# Patient Record
Sex: MALE | Race: OTHER | Employment: FULL TIME | URBAN - METROPOLITAN AREA
[De-identification: names, ages, dates, MRNs, and addresses within clinical notes are randomized per-mention and may not be internally consistent; named-entity substitution may affect disease eponyms.]

---

## 2021-09-25 ENCOUNTER — HOSPITAL ENCOUNTER (OUTPATIENT)
Dept: GENERAL RADIOLOGY | Age: 51
Discharge: HOME | End: 2021-09-25
Attending: FAMILY MEDICINE
Payer: COMMERCIAL

## 2021-09-25 ENCOUNTER — OFFICE VISIT (OUTPATIENT)
Dept: FAMILY MEDICINE CLINIC | Facility: CLINIC | Age: 51
End: 2021-09-25
Payer: COMMERCIAL

## 2021-09-25 VITALS
SYSTOLIC BLOOD PRESSURE: 120 MMHG | OXYGEN SATURATION: 98 % | TEMPERATURE: 98 F | WEIGHT: 145 LBS | RESPIRATION RATE: 18 BRPM | HEIGHT: 62 IN | DIASTOLIC BLOOD PRESSURE: 70 MMHG | BODY MASS INDEX: 26.68 KG/M2 | HEART RATE: 65 BPM

## 2021-09-25 DIAGNOSIS — Z80.42 FAMILY HISTORY OF PROSTATE CANCER: ICD-10-CM

## 2021-09-25 DIAGNOSIS — Z00.00 ANNUAL PHYSICAL EXAM: Primary | ICD-10-CM

## 2021-09-25 DIAGNOSIS — R35.0 URINE FREQUENCY: ICD-10-CM

## 2021-09-25 DIAGNOSIS — R06.02 SHORTNESS OF BREATH: ICD-10-CM

## 2021-09-25 DIAGNOSIS — K21.9 GASTROESOPHAGEAL REFLUX DISEASE, UNSPECIFIED WHETHER ESOPHAGITIS PRESENT: ICD-10-CM

## 2021-09-25 LAB
ALBUMIN SERPL-MCNC: 3.7 G/DL (ref 3.4–5)
ALBUMIN/GLOB SERPL: 0.9 {RATIO} (ref 1–2)
ALP LIVER SERPL-CCNC: 72 U/L
ALT SERPL-CCNC: 21 U/L
ANION GAP SERPL CALC-SCNC: 4 MMOL/L (ref 0–18)
APPEARANCE: CLEAR
AST SERPL-CCNC: 18 U/L (ref 15–37)
BASOPHILS # BLD AUTO: 0.06 X10(3) UL (ref 0–0.2)
BASOPHILS NFR BLD AUTO: 1.2 %
BILIRUB SERPL-MCNC: 0.8 MG/DL (ref 0.1–2)
BILIRUBIN: NEGATIVE
BUN BLD-MCNC: 19 MG/DL (ref 7–18)
CALCIUM BLD-MCNC: 8.5 MG/DL (ref 8.5–10.1)
CHLORIDE SERPL-SCNC: 107 MMOL/L (ref 98–112)
CHOLEST SERPL-MCNC: 171 MG/DL (ref ?–200)
CO2 SERPL-SCNC: 27 MMOL/L (ref 21–32)
COMPLEXED PSA SERPL-MCNC: 0.49 NG/ML (ref ?–4)
CREAT BLD-MCNC: 0.88 MG/DL
EOSINOPHIL # BLD AUTO: 0.1 X10(3) UL (ref 0–0.7)
EOSINOPHIL NFR BLD AUTO: 2 %
ERYTHROCYTE [DISTWIDTH] IN BLOOD BY AUTOMATED COUNT: 12.6 %
GLOBULIN PLAS-MCNC: 3.9 G/DL (ref 2.8–4.4)
GLUCOSE (URINE DIPSTICK): NEGATIVE MG/DL
GLUCOSE BLD-MCNC: 96 MG/DL (ref 70–99)
HCT VFR BLD AUTO: 46.6 %
HDLC SERPL-MCNC: 61 MG/DL (ref 40–59)
HGB BLD-MCNC: 15.6 G/DL
IMM GRANULOCYTES # BLD AUTO: 0.01 X10(3) UL (ref 0–1)
IMM GRANULOCYTES NFR BLD: 0.2 %
KETONES (URINE DIPSTICK): NEGATIVE MG/DL
LDLC SERPL CALC-MCNC: 93 MG/DL (ref ?–100)
LEUKOCYTES: NEGATIVE
LYMPHOCYTES # BLD AUTO: 2.29 X10(3) UL (ref 1–4)
LYMPHOCYTES NFR BLD AUTO: 45.6 %
MCH RBC QN AUTO: 29.7 PG (ref 26–34)
MCHC RBC AUTO-ENTMCNC: 33.5 G/DL (ref 31–37)
MCV RBC AUTO: 88.8 FL
MONOCYTES # BLD AUTO: 0.48 X10(3) UL (ref 0.1–1)
MONOCYTES NFR BLD AUTO: 9.6 %
MULTISTIX LOT#: 5077 NUMERIC
NEUTROPHILS # BLD AUTO: 2.08 X10 (3) UL (ref 1.5–7.7)
NEUTROPHILS # BLD AUTO: 2.08 X10(3) UL (ref 1.5–7.7)
NEUTROPHILS NFR BLD AUTO: 41.4 %
NITRITE, URINE: NEGATIVE
NONHDLC SERPL-MCNC: 110 MG/DL (ref ?–130)
OCCULT BLOOD: NEGATIVE
OSMOLALITY SERPL CALC.SUM OF ELEC: 288 MOSM/KG (ref 275–295)
PATIENT FASTING Y/N/NP: YES
PATIENT FASTING Y/N/NP: YES
PH, URINE: 7.5 (ref 4.5–8)
PLATELET # BLD AUTO: 233 10(3)UL (ref 150–450)
POTASSIUM SERPL-SCNC: 4 MMOL/L (ref 3.5–5.1)
PROT SERPL-MCNC: 7.6 G/DL (ref 6.4–8.2)
PROTEIN (URINE DIPSTICK): NEGATIVE MG/DL
RBC # BLD AUTO: 5.25 X10(6)UL
SODIUM SERPL-SCNC: 138 MMOL/L (ref 136–145)
SPECIFIC GRAVITY: 1.02 (ref 1–1.03)
TRIGL SERPL-MCNC: 92 MG/DL (ref 30–149)
TSI SER-ACNC: 1.06 MIU/ML (ref 0.36–3.74)
URINE-COLOR: YELLOW
UROBILINOGEN,SEMI-QN: 0.2 MG/DL (ref 0–1.9)
VLDLC SERPL CALC-MCNC: 15 MG/DL (ref 0–30)
WBC # BLD AUTO: 5 X10(3) UL (ref 4–11)

## 2021-09-25 PROCEDURE — 3078F DIAST BP <80 MM HG: CPT | Performed by: FAMILY MEDICINE

## 2021-09-25 PROCEDURE — 85025 COMPLETE CBC W/AUTO DIFF WBC: CPT | Performed by: FAMILY MEDICINE

## 2021-09-25 PROCEDURE — 84443 ASSAY THYROID STIM HORMONE: CPT | Performed by: FAMILY MEDICINE

## 2021-09-25 PROCEDURE — 3074F SYST BP LT 130 MM HG: CPT | Performed by: FAMILY MEDICINE

## 2021-09-25 PROCEDURE — 99386 PREV VISIT NEW AGE 40-64: CPT | Performed by: FAMILY MEDICINE

## 2021-09-25 PROCEDURE — 93000 ELECTROCARDIOGRAM COMPLETE: CPT | Performed by: FAMILY MEDICINE

## 2021-09-25 PROCEDURE — 71046 X-RAY EXAM CHEST 2 VIEWS: CPT | Performed by: FAMILY MEDICINE

## 2021-09-25 PROCEDURE — 80053 COMPREHEN METABOLIC PANEL: CPT | Performed by: FAMILY MEDICINE

## 2021-09-25 PROCEDURE — 3008F BODY MASS INDEX DOCD: CPT | Performed by: FAMILY MEDICINE

## 2021-09-25 PROCEDURE — 80061 LIPID PANEL: CPT | Performed by: FAMILY MEDICINE

## 2021-09-25 PROCEDURE — 81003 URINALYSIS AUTO W/O SCOPE: CPT | Performed by: FAMILY MEDICINE

## 2021-09-25 RX ORDER — NICOTINE POLACRILEX 4 MG/1
1 GUM, CHEWING ORAL
Qty: 90 TABLET | Refills: 0 | Status: SHIPPED | OUTPATIENT
Start: 2021-09-25 | End: 2021-10-25

## 2021-09-25 NOTE — PATIENT INSTRUCTIONS
Exercise for a Healthier Heart     Exercise with a friend. When activity is fun, you're more likely to stick with it. You may wonder how you can improve the health of your heart. If you’re thinking about exercise, you’re on the right track.  You don’t n you enjoy. Walking is one of the easiest things you can do. You can also try swimming, riding a bike, dancing, or taking an exercise class.   Stop exercising and call your doctor if you:  · Have chest pain or feel dizzy or lightheaded  · Feel burning, tight molasses. Cut your usual amount by half. · Use non-sugar sweeteners. Stevia, aspartame, and sucralose can satisfy a sweet tooth without adding calories. · Swap out sugar-filled soda and other drinks. Buy sugar-free or low-calorie beverages.  Remember denise your food with herbs and flavorings. Try lemon, garlic, and onion, or salt-free herb seasonings. · Limit convenience foods, such as boxed or canned foods and restaurant food. · Read food labels and choose lower-sodium options. Step 3.  Limit sugar  · limiting chocolate, peppermint, and spearmint. These can make acid reflux worse in some people. Watch when you eat  · Don't lie down for 3 hours after eating. · Don't snack before going to bed.     Raise your head  Raising your head and upper body by 4

## 2021-09-25 NOTE — PROGRESS NOTES
Chyna Gonzalez is a 54year old male who presents for a complete physical exam.   HPI:    line used. Chest sharp pain with sob comes and goes and last for few secs and its presentfor a while. First it will happen it once while.  Sarah pain  NEURO: denies headaches or dizziness  PSYCHE: denies depression or anxiety      EXAM:   Resp 18   Ht 5' 2\" (1.575 m)   Wt 145 lb (65.8 kg)   SpO2 98%   BMI 26.52 kg/m²   Body mass index is 26.52 kg/m².    GENERAL: well nourished,in no apparent distre

## 2021-09-28 ENCOUNTER — TELEPHONE (OUTPATIENT)
Dept: FAMILY MEDICINE CLINIC | Facility: CLINIC | Age: 51
End: 2021-09-28

## 2021-09-28 DIAGNOSIS — R06.02 SHORTNESS OF BREATH: Primary | ICD-10-CM

## 2021-09-28 DIAGNOSIS — R07.9 CHEST PAIN, UNSPECIFIED TYPE: ICD-10-CM

## 2021-09-28 NOTE — TELEPHONE ENCOUNTER
----- Message from Lakshmi Sandhu MD sent at 9/28/2021  8:56 AM CDT -----  Please inform chest xray normal and blood test normal. Chest pain can be musculoskeletal in nature. Can take tylenol /ibuprofen as needed.  If chest pain/sob continues for 7 to 10 a

## 2021-09-29 ENCOUNTER — TELEPHONE (OUTPATIENT)
Dept: FAMILY MEDICINE CLINIC | Facility: CLINIC | Age: 51
End: 2021-09-29

## 2021-09-29 NOTE — TELEPHONE ENCOUNTER
Please call patient next wk on wednesdya 10/6 and get update on chest pain and sob.  If still has it schedule ov with me

## 2021-09-30 NOTE — TELEPHONE ENCOUNTER
Patient called back for test results. Patient understands his chest xrays and blood test are normal. Patient states his right arm falls asleep when he lays down.  Also when he runs for 1 min he stats to have chest pressure and sob for seconds and it goes aw

## 2021-10-01 NOTE — TELEPHONE ENCOUNTER
His preliminary testing is normal so I would recommend seeing a cardiology for chest pain if they want to do further testing ot make sure his chest pain is not heart related. Referral placed.

## 2021-10-13 NOTE — TELEPHONE ENCOUNTER
Pt was transferred to me. (Sri [place]ankan speaking)  Pt states he is been having SOB,it least x1 second . 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   Pt thinks this happened in [id number] was having a bad cough x 1 month and since th

## 2021-10-13 NOTE — TELEPHONE ENCOUNTER
FYI we have reached out x3 with no response.     Future Appointments   Date Time Provider Mega Graham   11/10/2021  9:20 AM Rafat Mueller MD [facility]

## 2021-10-19 ENCOUNTER — OFFICE VISIT (OUTPATIENT)
Dept: FAMILY MEDICINE CLINIC | Facility: CLINIC | Age: 51
End: 2021-10-19
Payer: COMMERCIAL

## 2021-10-19 VITALS
RESPIRATION RATE: 18 BRPM | HEIGHT: 62 IN | OXYGEN SATURATION: 98 % | HEART RATE: 84 BPM | WEIGHT: 147 LBS | BODY MASS INDEX: 27.05 KG/M2 | DIASTOLIC BLOOD PRESSURE: 84 MMHG | SYSTOLIC BLOOD PRESSURE: 124 MMHG | TEMPERATURE: 98 F

## 2021-10-19 DIAGNOSIS — K21.9 GASTROESOPHAGEAL REFLUX DISEASE, UNSPECIFIED WHETHER ESOPHAGITIS PRESENT: ICD-10-CM

## 2021-10-19 DIAGNOSIS — R07.9 CHEST PAIN, UNSPECIFIED TYPE: Primary | ICD-10-CM

## 2021-10-19 PROCEDURE — 3079F DIAST BP 80-89 MM HG: CPT | Performed by: FAMILY MEDICINE

## 2021-10-19 PROCEDURE — 3074F SYST BP LT 130 MM HG: CPT | Performed by: FAMILY MEDICINE

## 2021-10-19 PROCEDURE — 3008F BODY MASS INDEX DOCD: CPT | Performed by: FAMILY MEDICINE

## 2021-10-19 PROCEDURE — 99214 OFFICE O/P EST MOD 30 MIN: CPT | Performed by: FAMILY MEDICINE

## 2021-10-19 NOTE — PROGRESS NOTES
Patient presents with:  Chest Pressure: issues x years, per pt       HPI:    Patient ID: Damian Maharaj is a 54year old male. HPI    used. Patient is here for follow-up on chest pain.   During the last visit EKG and chest x-ray is not cardiac origin and this can be secondary to anxiety will consider starting on medication. Verbalized understanding.  - CARDIO - INTERNAL    #2 GERD continue medication. Follow-up with GI. No follow-ups on file.

## 2022-01-28 PROBLEM — Z12.11 COLON CANCER SCREENING: Status: ACTIVE | Noted: 2022-01-28

## 2022-01-28 PROBLEM — R10.13 DYSPEPSIA: Status: ACTIVE | Noted: 2022-01-28

## 2022-02-03 ENCOUNTER — TELEPHONE (OUTPATIENT)
Dept: FAMILY MEDICINE CLINIC | Facility: CLINIC | Age: 52
End: 2022-02-03

## 2022-02-03 NOTE — TELEPHONE ENCOUNTER
Dr. Sherrill Zelaya office () is requiring a covid test prior to pt's sx scheduled 2/15/22 per protocol.

## 2022-02-03 NOTE — TELEPHONE ENCOUNTER
Spoke with pt's wife (Romanian speaking)  Chetna Morgan per [employer] consent. GI placed COVID test already  Central scheduling # given to wife. She verbalized understanding.

## 2022-02-13 ENCOUNTER — LAB ENCOUNTER (OUTPATIENT)
Dept: LAB | Facility: HOSPITAL | Age: 52
End: 2022-02-13
Attending: INTERNAL MEDICINE
Payer: COMMERCIAL

## 2022-02-13 DIAGNOSIS — Z01.818 PRE-OP TESTING: ICD-10-CM

## 2022-02-14 LAB — SARS-COV-2 RNA RESP QL NAA+PROBE: NOT DETECTED

## 2022-02-16 PROBLEM — Z12.11 SPECIAL SCREENING FOR MALIGNANT NEOPLASM OF COLON: Status: ACTIVE | Noted: 2022-02-16

## 2022-02-16 PROBLEM — Z12.11 SPECIAL SCREENING FOR MALIGNANT NEOPLASMS, COLON: Status: ACTIVE | Noted: 2022-02-16

## 2022-02-16 PROBLEM — K21.9 GASTROESOPHAGEAL REFLUX DISEASE: Status: ACTIVE | Noted: 2022-02-16

## 2022-04-22 ENCOUNTER — HOSPITAL ENCOUNTER (EMERGENCY)
Facility: HOSPITAL | Age: 52
Discharge: HOME | End: 2022-04-22
Attending: EMERGENCY MEDICINE
Payer: COMMERCIAL

## 2022-04-22 ENCOUNTER — HOSPITAL ENCOUNTER (OUTPATIENT)
Age: 52
Discharge: ANOTHER HOSPITAL | End: 2022-04-22
Payer: COMMERCIAL

## 2022-04-22 VITALS
TEMPERATURE: 98 F | SYSTOLIC BLOOD PRESSURE: 123 MMHG | DIASTOLIC BLOOD PRESSURE: 72 MMHG | OXYGEN SATURATION: 96 % | BODY MASS INDEX: 24 KG/M2 | WEIGHT: 149.94 LBS | HEART RATE: 80 BPM | RESPIRATION RATE: 18 BRPM

## 2022-04-22 VITALS
DIASTOLIC BLOOD PRESSURE: 68 MMHG | TEMPERATURE: 98 F | RESPIRATION RATE: 14 BRPM | HEART RATE: 88 BPM | SYSTOLIC BLOOD PRESSURE: 112 MMHG | OXYGEN SATURATION: 96 %

## 2022-04-22 DIAGNOSIS — R30.0 DYSURIA: ICD-10-CM

## 2022-04-22 DIAGNOSIS — N41.0 ACUTE PROSTATITIS: Primary | ICD-10-CM

## 2022-04-22 DIAGNOSIS — N50.819 PAIN IN TESTICLE, UNSPECIFIED LATERALITY: Primary | ICD-10-CM

## 2022-04-22 LAB
BILIRUB UR QL STRIP.AUTO: NEGATIVE
COLOR UR AUTO: YELLOW
GLUCOSE UR STRIP.AUTO-MCNC: NEGATIVE MG/DL
KETONES UR STRIP.AUTO-MCNC: NEGATIVE MG/DL
LEUKOCYTE ESTERASE UR QL STRIP.AUTO: NEGATIVE
NITRITE UR QL STRIP.AUTO: NEGATIVE
PH UR STRIP.AUTO: 8 [PH] (ref 5–8)
POCT BILIRUBIN URINE: NEGATIVE
POCT GLUCOSE URINE: NEGATIVE MG/DL
POCT KETONE URINE: NEGATIVE MG/DL
POCT LEUKOCYTE ESTERASE URINE: NEGATIVE
POCT NITRITE URINE: NEGATIVE
POCT PH URINE: 7 (ref 5–8)
POCT PROTEIN URINE: NEGATIVE MG/DL
POCT SPECIFIC GRAVITY URINE: 1.03
POCT URINE COLOR: YELLOW
POCT UROBILINOGEN URINE: 0.2 MG/DL
PROT UR STRIP.AUTO-MCNC: NEGATIVE MG/DL
RBC UR QL AUTO: NEGATIVE
SP GR UR STRIP.AUTO: 1.02 (ref 1–1.03)
UROBILINOGEN UR STRIP.AUTO-MCNC: <2 MG/DL

## 2022-04-22 PROCEDURE — 99283 EMERGENCY DEPT VISIT LOW MDM: CPT

## 2022-04-22 PROCEDURE — 99205 OFFICE O/P NEW HI 60 MIN: CPT | Performed by: PHYSICIAN ASSISTANT

## 2022-04-22 PROCEDURE — 81003 URINALYSIS AUTO W/O SCOPE: CPT

## 2022-04-22 PROCEDURE — 81002 URINALYSIS NONAUTO W/O SCOPE: CPT | Performed by: PHYSICIAN ASSISTANT

## 2022-04-22 RX ORDER — LEVOFLOXACIN 750 MG/1
750 TABLET ORAL DAILY
Qty: 14 TABLET | Refills: 0 | Status: SHIPPED | OUTPATIENT
Start: 2022-04-22 | End: 2022-05-06

## 2022-04-22 NOTE — ED INITIAL ASSESSMENT (HPI)
X 1 wk Pt c/o dysuria, penile pain during the day, penile drainage/uriantion leaking. Pt reports this occurring in the past but only lasting for 1 day and attributes to prostate issues. Testicular pain/discomfort, burning with urination.

## 2022-04-23 NOTE — ED INITIAL ASSESSMENT (HPI)
Pain with urination over the last week, pain comes and goes although at times constant. Denies any swelling to scrotum or testicles. Fevers or lower back or abdominal pains.   Hx of hematuria per seen by PCP for this no hx of seeing uro

## 2022-05-28 ENCOUNTER — OFFICE VISIT (OUTPATIENT)
Dept: FAMILY MEDICINE CLINIC | Facility: CLINIC | Age: 52
End: 2022-05-28
Payer: COMMERCIAL

## 2022-05-28 VITALS
OXYGEN SATURATION: 98 % | BODY MASS INDEX: 23.78 KG/M2 | HEIGHT: 66 IN | RESPIRATION RATE: 18 BRPM | DIASTOLIC BLOOD PRESSURE: 60 MMHG | SYSTOLIC BLOOD PRESSURE: 110 MMHG | HEART RATE: 71 BPM | WEIGHT: 148 LBS

## 2022-05-28 DIAGNOSIS — N41.0 ACUTE PROSTATITIS: Primary | ICD-10-CM

## 2022-05-28 DIAGNOSIS — K64.9 HEMORRHOIDS, UNSPECIFIED HEMORRHOID TYPE: ICD-10-CM

## 2022-05-28 LAB
APPEARANCE: CLEAR
BILIRUBIN: NEGATIVE
GLUCOSE (URINE DIPSTICK): NEGATIVE MG/DL
KETONES (URINE DIPSTICK): NEGATIVE MG/DL
LEUKOCYTES: NEGATIVE
MULTISTIX LOT#: NORMAL NUMERIC
NITRITE, URINE: NEGATIVE
OCCULT BLOOD: NEGATIVE
PH, URINE: 7.5 (ref 4.5–8)
PROTEIN (URINE DIPSTICK): NEGATIVE MG/DL
SPECIFIC GRAVITY: 1.02 (ref 1–1.03)
URINE-COLOR: YELLOW
UROBILINOGEN,SEMI-QN: 0.2 MG/DL (ref 0–1.9)

## 2022-05-28 PROCEDURE — 87086 URINE CULTURE/COLONY COUNT: CPT | Performed by: FAMILY MEDICINE

## 2022-05-28 RX ORDER — LEVOFLOXACIN 750 MG/1
750 TABLET ORAL DAILY
Qty: 14 TABLET | Refills: 0 | Status: SHIPPED | OUTPATIENT
Start: 2022-05-28 | End: 2022-06-11

## 2022-05-28 RX ORDER — HYDROCORTISONE 25 MG/G
1 CREAM TOPICAL 2 TIMES DAILY
Qty: 28 G | Refills: 0 | Status: SHIPPED | OUTPATIENT
Start: 2022-05-28 | End: 2022-06-07

## 2022-06-01 ENCOUNTER — TELEPHONE (OUTPATIENT)
Dept: FAMILY MEDICINE CLINIC | Facility: CLINIC | Age: 52
End: 2022-06-01

## 2022-06-01 NOTE — TELEPHONE ENCOUNTER
----- Message from Steph Oneil MD sent at 5/31/2022  5:32 PM CDT -----  Please inform urine culture results shows no growth however his symptoms?

## 2022-06-02 NOTE — TELEPHONE ENCOUNTER
Wife advised. Verbalized understanding. Ok per [employer] form  Advised sx resolved. Advised if they return let us know. Verbalized understanding.

## 2022-08-09 ENCOUNTER — OFFICE VISIT (OUTPATIENT)
Dept: SURGERY | Facility: CLINIC | Age: 52
End: 2022-08-09
Payer: COMMERCIAL

## 2022-08-09 DIAGNOSIS — R82.90 URINE FINDING: Primary | ICD-10-CM

## 2022-08-09 LAB
BILIRUBIN: NEGATIVE
GLUCOSE (URINE DIPSTICK): NEGATIVE MG/DL
KETONES (URINE DIPSTICK): NEGATIVE MG/DL
LEUKOCYTES: NEGATIVE
MULTISTIX LOT#: NORMAL NUMERIC
NITRITE, URINE: NEGATIVE
OCCULT BLOOD: NEGATIVE
PH, URINE: 7.5 (ref 4.5–8)
PROTEIN (URINE DIPSTICK): NEGATIVE MG/DL
SPECIFIC GRAVITY: 1.02 (ref 1–1.03)
URINE-COLOR: YELLOW
UROBILINOGEN,SEMI-QN: 0.2 MG/DL (ref 0–1.9)

## 2022-08-09 PROCEDURE — 99203 OFFICE O/P NEW LOW 30 MIN: CPT | Performed by: UROLOGY

## 2022-08-09 PROCEDURE — 81003 URINALYSIS AUTO W/O SCOPE: CPT | Performed by: UROLOGY

## 2023-10-10 ENCOUNTER — OFFICE VISIT (OUTPATIENT)
Dept: OTOLARYNGOLOGY | Facility: CLINIC | Age: 53
End: 2023-10-10

## 2023-10-10 VITALS — HEIGHT: 64 IN | WEIGHT: 150 LBS | BODY MASS INDEX: 25.61 KG/M2

## 2023-10-10 DIAGNOSIS — H61.23 BILATERAL IMPACTED CERUMEN: Primary | ICD-10-CM

## 2023-10-10 PROCEDURE — 99202 OFFICE O/P NEW SF 15 MIN: CPT | Performed by: OTOLARYNGOLOGY

## 2023-10-10 PROCEDURE — 3008F BODY MASS INDEX DOCD: CPT | Performed by: OTOLARYNGOLOGY

## 2025-05-08 ENCOUNTER — TELEPHONE (OUTPATIENT)
Dept: FAMILY MEDICINE CLINIC | Facility: CLINIC | Age: 55
End: 2025-05-08

## 2025-05-08 DIAGNOSIS — Z01.812 PRE-OPERATIVE LABORATORY EXAMINATION: ICD-10-CM

## 2025-05-08 DIAGNOSIS — Z01.818 PREOP EXAMINATION: Primary | ICD-10-CM

## 2025-05-09 ENCOUNTER — LAB ENCOUNTER (OUTPATIENT)
Dept: LAB | Facility: HOSPITAL | Age: 55
End: 2025-05-09
Attending: FAMILY MEDICINE
Payer: OTHER MISCELLANEOUS

## 2025-05-09 ENCOUNTER — OFFICE VISIT (OUTPATIENT)
Dept: FAMILY MEDICINE CLINIC | Facility: CLINIC | Age: 55
End: 2025-05-09
Payer: OTHER MISCELLANEOUS

## 2025-05-09 ENCOUNTER — HOSPITAL ENCOUNTER (OUTPATIENT)
Dept: GENERAL RADIOLOGY | Facility: HOSPITAL | Age: 55
Discharge: HOME | End: 2025-05-09
Attending: FAMILY MEDICINE
Payer: OTHER MISCELLANEOUS

## 2025-05-09 VITALS
SYSTOLIC BLOOD PRESSURE: 110 MMHG | WEIGHT: 150 LBS | HEART RATE: 71 BPM | HEIGHT: 66 IN | BODY MASS INDEX: 24.11 KG/M2 | RESPIRATION RATE: 16 BRPM | TEMPERATURE: 97 F | DIASTOLIC BLOOD PRESSURE: 72 MMHG | OXYGEN SATURATION: 98 %

## 2025-05-09 DIAGNOSIS — Z01.812 PRE-OPERATIVE LABORATORY EXAMINATION: ICD-10-CM

## 2025-05-09 DIAGNOSIS — M48.062 SPINAL STENOSIS OF LUMBAR REGION WITH NEUROGENIC CLAUDICATION: Primary | ICD-10-CM

## 2025-05-09 DIAGNOSIS — K21.00 GASTROESOPHAGEAL REFLUX DISEASE WITH ESOPHAGITIS WITHOUT HEMORRHAGE: ICD-10-CM

## 2025-05-09 DIAGNOSIS — Z01.818 PREOP EXAMINATION: ICD-10-CM

## 2025-05-09 DIAGNOSIS — R10.13 DYSPEPSIA: ICD-10-CM

## 2025-05-09 DIAGNOSIS — R07.89 ATYPICAL CHEST PAIN: ICD-10-CM

## 2025-05-09 LAB
ALBUMIN SERPL-MCNC: 4.6 G/DL (ref 3.2–4.8)
ALBUMIN/GLOB SERPL: 1.6 {RATIO} (ref 1–2)
ALP LIVER SERPL-CCNC: 79 U/L (ref 45–117)
ALT SERPL-CCNC: 16 U/L (ref 10–49)
ANION GAP SERPL CALC-SCNC: 6 MMOL/L (ref 0–18)
APTT PPP: 30.4 SECONDS (ref 23–36)
AST SERPL-CCNC: 18 U/L (ref ?–34)
ATRIAL RATE: 62 BPM
BASOPHILS # BLD AUTO: 0.06 X10(3) UL (ref 0–0.2)
BASOPHILS NFR BLD AUTO: 0.9 %
BILIRUB SERPL-MCNC: 0.8 MG/DL (ref 0.3–1.2)
BILIRUB UR QL: NEGATIVE
BUN BLD-MCNC: 21 MG/DL (ref 9–23)
BUN/CREAT SERPL: 23.1 (ref 10–20)
CALCIUM BLD-MCNC: 9.1 MG/DL (ref 8.7–10.4)
CHLORIDE SERPL-SCNC: 102 MMOL/L (ref 98–112)
CLARITY UR: CLEAR
CO2 SERPL-SCNC: 28 MMOL/L (ref 21–32)
COLOR UR: YELLOW
CREAT BLD-MCNC: 0.91 MG/DL (ref 0.7–1.3)
DEPRECATED RDW RBC AUTO: 41.6 FL (ref 35.1–46.3)
EGFRCR SERPLBLD CKD-EPI 2021: 101 ML/MIN/1.73M2 (ref 60–?)
EOSINOPHIL # BLD AUTO: 0.23 X10(3) UL (ref 0–0.7)
EOSINOPHIL NFR BLD AUTO: 3.4 %
ERYTHROCYTE [DISTWIDTH] IN BLOOD BY AUTOMATED COUNT: 13.3 % (ref 11–15)
FASTING STATUS PATIENT QL REPORTED: YES
GLOBULIN PLAS-MCNC: 2.9 G/DL (ref 2–3.5)
GLUCOSE BLD-MCNC: 89 MG/DL (ref 70–99)
GLUCOSE UR-MCNC: NORMAL MG/DL
HCT VFR BLD AUTO: 42.1 % (ref 39–53)
HGB BLD-MCNC: 14.3 G/DL (ref 13–17.5)
HGB UR QL STRIP.AUTO: NEGATIVE
IMM GRANULOCYTES # BLD AUTO: 0.02 X10(3) UL (ref 0–1)
IMM GRANULOCYTES NFR BLD: 0.3 %
INR BLD: 0.96 (ref 0.8–1.2)
KETONES UR-MCNC: NEGATIVE MG/DL
LEUKOCYTE ESTERASE UR QL STRIP.AUTO: NEGATIVE
LYMPHOCYTES # BLD AUTO: 2.65 X10(3) UL (ref 1–4)
LYMPHOCYTES NFR BLD AUTO: 39.4 %
MCH RBC QN AUTO: 29.2 PG (ref 26–34)
MCHC RBC AUTO-ENTMCNC: 34 G/DL (ref 31–37)
MCV RBC AUTO: 85.9 FL (ref 80–100)
MONOCYTES # BLD AUTO: 0.63 X10(3) UL (ref 0.1–1)
MONOCYTES NFR BLD AUTO: 9.4 %
NEUTROPHILS # BLD AUTO: 3.14 X10 (3) UL (ref 1.5–7.7)
NEUTROPHILS # BLD AUTO: 3.14 X10(3) UL (ref 1.5–7.7)
NEUTROPHILS NFR BLD AUTO: 46.6 %
NITRITE UR QL STRIP.AUTO: NEGATIVE
OSMOLALITY SERPL CALC.SUM OF ELEC: 284 MOSM/KG (ref 275–295)
P AXIS: 74 DEGREES
P-R INTERVAL: 146 MS
PH UR: 6 [PH] (ref 5–8)
PLATELET # BLD AUTO: 244 10(3)UL (ref 150–450)
POTASSIUM SERPL-SCNC: 3.7 MMOL/L (ref 3.5–5.1)
PROT SERPL-MCNC: 7.5 G/DL (ref 5.7–8.2)
PROT UR-MCNC: NEGATIVE MG/DL
PROTHROMBIN TIME: 13.3 SECONDS (ref 11.6–14.8)
Q-T INTERVAL: 382 MS
QRS DURATION: 90 MS
QTC CALCULATION (BEZET): 387 MS
R AXIS: 82 DEGREES
RBC # BLD AUTO: 4.9 X10(6)UL (ref 4.3–5.7)
SODIUM SERPL-SCNC: 136 MMOL/L (ref 136–145)
SP GR UR STRIP: >1.03 (ref 1–1.03)
T AXIS: 45 DEGREES
UROBILINOGEN UR STRIP-ACNC: NORMAL
VENTRICULAR RATE: 62 BPM
WBC # BLD AUTO: 6.7 X10(3) UL (ref 4–11)

## 2025-05-09 PROCEDURE — 36415 COLL VENOUS BLD VENIPUNCTURE: CPT

## 2025-05-09 PROCEDURE — 93010 ELECTROCARDIOGRAM REPORT: CPT | Performed by: INTERNAL MEDICINE

## 2025-05-09 PROCEDURE — 87081 CULTURE SCREEN ONLY: CPT

## 2025-05-09 PROCEDURE — 93005 ELECTROCARDIOGRAM TRACING: CPT

## 2025-05-09 PROCEDURE — 85730 THROMBOPLASTIN TIME PARTIAL: CPT

## 2025-05-09 PROCEDURE — 85025 COMPLETE CBC W/AUTO DIFF WBC: CPT

## 2025-05-09 PROCEDURE — 71046 X-RAY EXAM CHEST 2 VIEWS: CPT | Performed by: FAMILY MEDICINE

## 2025-05-09 PROCEDURE — 80053 COMPREHEN METABOLIC PANEL: CPT

## 2025-05-09 PROCEDURE — 81003 URINALYSIS AUTO W/O SCOPE: CPT

## 2025-05-09 PROCEDURE — 85610 PROTHROMBIN TIME: CPT

## 2025-05-09 NOTE — H&P
[facility] PRE-OP CLINIC [place]    PRE-OP NOTE    HPI:   I have been consulted by Dr. Cortez to see Chester Ramirez 55 year old male for a preoperative evaluation and medical clearance. he has a long history of worsening severe lumbar spinal stenosis . Patient is to have a L5-S1 microdiscectomy  by Dr. Cortez  on a date to be determined  .     Pt suffers significant pain and loss of function.     He has no cardiopulmonary symptoms. But he has some atypical chest pains.      He has n history of MÓNICA or DVT. Denies tobacco use.       Family History[1]   Current Medications[2]  Past Medical History[3]  Past Surgical History[4]  Social History     Socioeconomic History    Marital status:      Spouse name: Not on file    Number of children: Not on file    Years of education: Not on file    Highest education level: Not on file   Occupational History    Not on file   Tobacco Use    Smoking status: Never     Passive exposure: Never    Smokeless tobacco: Never    Tobacco comments:     non- smoker   Vaping Use    Vaping status: Never Used   Substance and Sexual Activity    Alcohol use: Never    Drug use: Never    Sexual activity: Not on file   Other Topics Concern    Not on file   Social History Narrative    Not on file     Social Drivers of Health     Food Insecurity: Not on file   Transportation Needs: Not on file   Stress: Not on file   Housing Stability: Not on file       REVIEW OF SYSTEMS:   CONSTITUTIONAL:  Denies unusual weight gain/loss, fever, chills  EENT:  Eyes:  Denies eye pain, visual loss, blurred vision, double vision. Ears, Nose, Throat:  Denies congestion, runny nose or sore throat.  INTEGUMENTARY:  Denies rashes, itching, skin lesion,   CARDIOVASCULAR:  Denies DVT. Denies chest pain, palpitations, edema, dyspnea  RESPIRATORY: Denies  MÓNICA ,Denies shortness of breath, wheezing, cough  GASTROINTESTINAL:  Denies abdominal pain, nausea, vomiting, constipation, diarrhea, or blood in  stool.  MUSCULOSKELETAL: severe pain to his low back as noted above  NEUROLOGICAL:  Denies headache, seizures, dizziness, syncope, paralysis, ataxia,  HEMATOLOGIC:  Denies anemia, bleeding or bruising.  LYMPHATICS:  Denies enlarged nodes   PSYCHIATRIC:  Denies depression or anxiety.  ENDOCRINOLOGIC: DM 2 NO,   ALLERGIES:  Denies allergic response, history of asthma, hives,     EXAM:   /72 (BP Location: Left arm)   Pulse 71   Temp 97.1 °F (36.2 °C) (Temporal)   Resp 16   Ht 5' 6\" (1.676 m)   Wt 150 lb (68 kg)   SpO2 98%   BMI 24.21 kg/m²  Estimated body mass index is 24.21 kg/m² as calculated from the following:    Height as of this encounter: 5' 6\" (1.676 m).    Weight as of this encounter: 150 lb (68 kg).   Vital signs reviewed.Appears stated age, well groomed.  Physical Exam:  GEN:  Patient is alert, awake and oriented, well developed, well nourished, no apparent distress.  HEENT:  Head:  Normocephalic, atraumatic Nose: patent, no nasal discharge  NECK: Supple, no CLAD, no carotid bruit, no thyromegaly.  SKIN: No rashes, no skin lesion, no bruising, good turgor.  HEART:  Regular rate and rhythm, no murmurs, rubs or gallops.  LUNGS: Clear to auscultation bilterally, no rales/rhonchi/wheezing.  CHEST: No tenderness.  ABDOMEN:  Soft, nondistended, nontender,  no masses, no hepatosplenomegaly.  BACK: low lumbar tenderness bilaterally ,   EXTREMITIES:  No edema, no cyanosis, no clubbing,   NEURO:  No focal deficit, speech fluent, he walks with an antalgic  gait, strength and tone, sensory intact      Lab Results   Component Value Date    WBC 5.0 09/25/2021    HGB 15.6 09/25/2021    HCT 46.6 09/25/2021    .0 09/25/2021    CREATSERUM 0.88 09/25/2021    BUN 19 (H) 09/25/2021     09/25/2021    K 4.0 09/25/2021     09/25/2021    CO2 27.0 09/25/2021    GLU 96 09/25/2021    CA 8.5 09/25/2021    ALB 3.7 09/25/2021    ALKPHO 72 09/25/2021    BILT 0.8 09/25/2021    TP 7.6 09/25/2021    AST 18  09/25/2021    ALT 21 09/25/2021    TSH 1.060 09/25/2021       No results found.    EKG 12 Lead  Result Date: 5/9/2025  Normal sinus rhythm Normal ECG No previous ECGs found in [place]      ASSESSMENT AND PLAN:   Chester Ramirez is a 55 year old male, with a hx of severe lumbar spinal stenosis  who presents for a pre-operative physical exam. Patient is to have a L5-S1 microdiscectomy  by Dr. Cortez  on a date to be determined .      ICD-10-CM    1. Spinal stenosis of lumbar region with neurogenic claudication  M48.062       2. Dyspepsia  R10.13       3. Atypical chest pain  R07.89       4. Gastroesophageal reflux disease with esophagitis without hemorrhage  K21.00       5. Pre-operative laboratory examination  Z01.812 CBC With Differential With Platelet     Comp Metabolic Panel (14)     EKG 12 Lead     MSSA and MRSA Culture Screen     Prothrombin Time (PT)     PTT, Activated     XR CHEST PA + LAT CHEST (CPT=71046)     Urinalysis with Culture Reflex      6. Preop examination  Z01.818 CBC With Differential With Platelet     Comp Metabolic Panel (14)     EKG 12 Lead     MSSA and MRSA Culture Screen     Prothrombin Time (PT)     PTT, Activated     XR CHEST PA + LAT CHEST (CPT=71046)     Urinalysis with Culture Reflex        Problem List[5]     ECG and labs are pending review     Preoperative Risk Stratification: There are no decompensated medical conditions. ASA classification 2    Medical history:  High risk surgery (vascular, thoracic, intra-peritoneal): No  CAD: No  CHF: No  Stroke: No  DM on insulin: No  Serum Creatinine >2 mg/dl: No  Patient has an RCRI score that is intermediate risk and is at intermediate  risk for major cardiac event in the perioperative period.     Patient is medically optimized and has an acceptable risk of surgery and may proceed with surgery as planned.     PLAN:    He will see Cardiology on 5/13/2025 for clearance     Patient to discontinue medications and supplements with anticoagulation  properties as per instruction.        Postoperative Recommendations:    Anticoagulation / DVT prophylaxis: SCDs, early ambulation.   GI protection: Protonix  Incentive Spirometry   Telemetry as needed  CPAP/O2 as needed   DM: QID glucoscans and sliding scale insulin as needed   Renal protection: (hydration / NSAID and ACE/ARB avoidance)   Cognitive protection: (minimize narcotics, benzodiazepines, scopolamine)     Pain management and Physical therapy as per Orthopedic service.   Home Health as needed    Thank you for the opportunity to care for your patient and to assist in managing the postoperative course.        Matheus Swift DO  5/9/2025  11:15 AM         [1] History reviewed. No pertinent family history.  [2]   No current outpatient medications on file.   [3]   Past Medical History:   Esophageal reflux   [4] History reviewed. No pertinent surgical history.  [5]   Patient Active Problem List  Diagnosis    Colon cancer screening    Dyspepsia    Special screening for malignant neoplasms, colon    Gastroesophageal reflux disease    Special screening for malignant neoplasm of colon

## 2025-07-14 ENCOUNTER — LAB ENCOUNTER (OUTPATIENT)
Dept: LAB | Age: 55
End: 2025-07-14
Attending: FAMILY MEDICINE
Payer: COMMERCIAL

## 2025-07-14 DIAGNOSIS — Z01.812 PRE-OPERATIVE LABORATORY EXAMINATION: ICD-10-CM

## 2025-07-14 DIAGNOSIS — Z01.818 PREOP EXAMINATION: ICD-10-CM

## 2025-07-14 LAB
ALBUMIN SERPL-MCNC: 4.1 G/DL (ref 3.2–4.8)
ALBUMIN/GLOB SERPL: 1.3 {RATIO} (ref 1–2)
ALP LIVER SERPL-CCNC: 72 U/L (ref 45–117)
ALT SERPL-CCNC: 14 U/L (ref 10–49)
ANION GAP SERPL CALC-SCNC: 4 MMOL/L (ref 0–18)
APTT PPP: 28.7 SECONDS (ref 23–36)
AST SERPL-CCNC: 20 U/L (ref ?–34)
BASOPHILS # BLD AUTO: 0.04 X10(3) UL (ref 0–0.2)
BASOPHILS NFR BLD AUTO: 0.7 %
BILIRUB SERPL-MCNC: 0.7 MG/DL (ref 0.3–1.2)
BILIRUB UR QL STRIP.AUTO: NEGATIVE
BUN BLD-MCNC: 16 MG/DL (ref 9–23)
CALCIUM BLD-MCNC: 9.2 MG/DL (ref 8.7–10.6)
CHLORIDE SERPL-SCNC: 105 MMOL/L (ref 98–112)
CLARITY UR REFRACT.AUTO: CLEAR
CO2 SERPL-SCNC: 31 MMOL/L (ref 21–32)
COLOR UR AUTO: YELLOW
CREAT BLD-MCNC: 1.01 MG/DL (ref 0.7–1.3)
EGFRCR SERPLBLD CKD-EPI 2021: 89 ML/MIN/1.73M2 (ref 60–?)
EOSINOPHIL # BLD AUTO: 0.17 X10(3) UL (ref 0–0.7)
EOSINOPHIL NFR BLD AUTO: 2.9 %
ERYTHROCYTE [DISTWIDTH] IN BLOOD BY AUTOMATED COUNT: 13.1 %
FASTING STATUS PATIENT QL REPORTED: YES
GLOBULIN PLAS-MCNC: 3.1 G/DL (ref 2–3.5)
GLUCOSE BLD-MCNC: 97 MG/DL (ref 70–99)
GLUCOSE UR STRIP.AUTO-MCNC: NORMAL MG/DL
HCT VFR BLD AUTO: 41.8 % (ref 39–53)
HGB BLD-MCNC: 14.4 G/DL (ref 13–17.5)
IMM GRANULOCYTES # BLD AUTO: 0.02 X10(3) UL (ref 0–1)
IMM GRANULOCYTES NFR BLD: 0.3 %
INR BLD: 0.98 (ref 0.8–1.2)
KETONES UR STRIP.AUTO-MCNC: NEGATIVE MG/DL
LEUKOCYTE ESTERASE UR QL STRIP.AUTO: NEGATIVE
LYMPHOCYTES # BLD AUTO: 2.86 X10(3) UL (ref 1–4)
LYMPHOCYTES NFR BLD AUTO: 49.1 %
MCH RBC QN AUTO: 30.4 PG (ref 26–34)
MCHC RBC AUTO-ENTMCNC: 34.4 G/DL (ref 31–37)
MCV RBC AUTO: 88.2 FL (ref 80–100)
MONOCYTES # BLD AUTO: 0.6 X10(3) UL (ref 0.1–1)
MONOCYTES NFR BLD AUTO: 10.3 %
NEUTROPHILS # BLD AUTO: 2.13 X10 (3) UL (ref 1.5–7.7)
NEUTROPHILS # BLD AUTO: 2.13 X10(3) UL (ref 1.5–7.7)
NEUTROPHILS NFR BLD AUTO: 36.7 %
NITRITE UR QL STRIP.AUTO: NEGATIVE
OSMOLALITY SERPL CALC.SUM OF ELEC: 291 MOSM/KG (ref 275–295)
PH UR STRIP.AUTO: 6 [PH] (ref 5–8)
PLATELET # BLD AUTO: 227 10(3)UL (ref 150–450)
POTASSIUM SERPL-SCNC: 3.8 MMOL/L (ref 3.5–5.1)
PROT SERPL-MCNC: 7.2 G/DL (ref 5.7–8.2)
PROT UR STRIP.AUTO-MCNC: NEGATIVE MG/DL
PROTHROMBIN TIME: 13.1 SECONDS (ref 11.6–14.8)
RBC # BLD AUTO: 4.74 X10(6)UL (ref 4.3–5.7)
SODIUM SERPL-SCNC: 140 MMOL/L (ref 136–145)
SP GR UR STRIP.AUTO: 1.03 (ref 1–1.03)
UROBILINOGEN UR STRIP.AUTO-MCNC: NORMAL MG/DL
WBC # BLD AUTO: 5.8 X10(3) UL (ref 4–11)

## 2025-07-14 PROCEDURE — 85730 THROMBOPLASTIN TIME PARTIAL: CPT

## 2025-07-14 PROCEDURE — 87081 CULTURE SCREEN ONLY: CPT

## 2025-07-14 PROCEDURE — 81001 URINALYSIS AUTO W/SCOPE: CPT

## 2025-07-14 PROCEDURE — 85025 COMPLETE CBC W/AUTO DIFF WBC: CPT

## 2025-07-14 PROCEDURE — 36415 COLL VENOUS BLD VENIPUNCTURE: CPT

## 2025-07-14 PROCEDURE — 80053 COMPREHEN METABOLIC PANEL: CPT

## 2025-07-14 PROCEDURE — 85610 PROTHROMBIN TIME: CPT

## 2025-07-21 RX ORDER — IBUPROFEN 200 MG
200 TABLET ORAL EVERY 6 HOURS PRN
COMMUNITY
End: 2025-07-24

## 2025-07-21 RX ORDER — NITROGLYCERIN 0.4 MG/1
TABLET SUBLINGUAL
COMMUNITY
Start: 2025-04-29

## 2025-07-22 NOTE — DISCHARGE INSTRUCTIONS
Instrucciones de [place]  Cuidado de la Herida  No cambie ni retire ibanez vendaje inicial postoperatorio tommy los primeros 2 loy después de la cirugía a menos que se le indique lo contrario por el personal del Dr. Neal o si está saturado de drenaje. Si se retira, aplique un vendaje limpio y seco sobre la incisión. Puede tener tiras de papel adhesivo delgadas en ibanez incisión después de la cirugía--por favor NO las retire. Se retirarán en ibanez desmond postoperatoria de 2 semanas. Puede quitarse el vendaje y ducharse el tercer día después de la cirugía. Deje que agua tibia y jabonosa fluya sobre el área de la incisión. No frote el área. Seque suavemente el área de la incisión por completo. Puede volver a aplicar un vendaje limpio y seco sobre la incisión para prevenir la irritación por la ropa, demetrius esto no es necesario después del día 3 postoperatorio. No se le permite sumergir ibanez incisión en angy bañera, jacuzzi o piscina hasta que la incisión esté completamente curada y el Dr. Neal o ibanez personal permitan estas actividades. No aplique geles antibióticos, ungüentos (Neosporin o bacitracina), lociones, peróxido o soluciones de yodo en o alrededor de la incisión.  Puede tener algo de hinchazón y/o drenaje bernie y amarillo alrededor de ibanez sitio de incisión. [place] es normal y puede tardar varias semanas en desaparecer. Por favor, no toque las costras superficiales y déjelas caer por sí solas.  Seguimiento Inmediato  Si notas enrojecimiento en la incisión o hinchazón aumentada, drenaje bernie continuo o cambio de color en el drenaje, mal olor, hinchazón significativa en la pierna, aumento del dolor y/o fiebre superior a 101.5, debes llamar a nuestra oficina. Si es después del horario laboral, debes presentarte en la lori de emergencias más cercana. Si tienes dificultad para respirar, dolor en el pecho, dolor abdominal significativo e hinchazón sin movimiento intestinal, pérdida completa de función intestinal o de la vejiga, por  favor contacta nuestra oficina y preséntate en la lori de emergencias local lo antes posible.  Después de la cirugía, puede experimentar dolor en o alrededor de la región de la incisión. También puede mariza dolor en los glúteos y las piernas, así leonora hormigueo o entumecimiento. Inicialmente, puede ser de mayor intensidad que antes de la cirugía, demetrius generalmente disminuirá con el tiempo a medida que ocurra el proceso de curación. Coral malestar es causado por la retracción quirúrgica del tejido, así leonora por la inflamación y la hinchazón de los nervios previamente comprimidos. La actividad temprana después de la cirugía es extremadamente importante para ayudar a prevenir complicaciones leonora la neumonía y los coágulos de eugenia. La actividad también promueve la recuperación, parvin la rigidez muscular, permite el desarrollo de angy cicatriz sabiha organizada y mejora ibanez resultado. Ibanez recuperación es angy parte esencial del proceso quirúrgico, así que, por favor, siga las pautas a continuación para volver a neda actividades deseadas de la manera más sanchez posible.  Semana 1  Trate de dormir al menos 8 horas cada noche. Un patrón de sueño interrumpido es común después del martinez del hospital y volverá a la normalidad con el tiempo.Evite doblarse y girar la cintura. No se doble más de lo necesario para vestirse. No gire más de lo necesario para ir al baño (limpiarse).No se siente por más de 1 hora a la vez. Camine y/o cambie de posición antes de volver a sentarse.  No puedes conducir, demetrius puedes ser llevado. Comienza un programa diario de caminatas con 1-2 shabana inicialmente; programa angy hora diaria y aumenta la distancia diariamente. Idealmente, nos gustaría que estuvieras de pie y caminando 15 minutos/hora. Come angy dieta equilibrada martinez en fibra y lewis mucha agua. Antionette los medicamentos según lo prescrito, usando narcóticos y relajantes musculares solo según sea necesario. Por favor, pregunta al Dr. Neal o a ibanez  personal cuándo es permisible reiniciar cualquier ELIZA leonora Advil, Motrin, Aleve, ibuprofeno, naproxeno, diclofenac, meloxicam o celecoxib. Antionette suavizantes de heces para ayudar con los movimientos intestinales.  Semana 2  Reanude el horario normal de levantarse y acostarse, demetrius continúe descansando tommy el día según sea necesario.No puede conducir, demetrius puede ser llevado.No levante nada que pese más de 10 a 15 libras.Continúe con las caminatas programadas, aumentando la distancia y la frecuencia según lo tolere.Puede reanudar las relaciones sexuales cuando se sienta cómodo entre 2 y 4 semanas después de la cirugía.Comience a disminuir el uso de medicamentos narcóticos para el dolor si aún no lo ha hecho.Regrese a la consulta en la oficina según lo programado para más instrucciones.  IncapacidadEl periodo habitual de recuperación para laminectomía, foraminotomía, microdiscectomía o hemilaminectomía es de 4 a 6 semanas. Sin embargo, la curación completa puede tardar de 3 a 6 meses. Algunos pacientes pueden regresar al trabajo antes que otros, dependiendo del tipo de trabajo, la respuesta a la cirugía y la capacidad para realizar otras tareas más ligeras en el lugar de trabajo. Se requiere aprobación médica antes de regresar al trabajo.  Política de Medicación para el Dolor Postoperatorio  El objetivo del Dr. Neal es hacer que te sientas lo más cómodo posible después de la cirugía. Nos damos cuenta de que el manejo del dolor no es perfecto y que experimentarás alguna incomodidad después de tu operación. Hay varios factores que limitan nuestra capacidad para eliminar completamente el dolor después de la cirugía. El leopoldo es que los medicamentos para el dolor tienen efectos secundarios. Te informamos que las dosis altas o el uso continuo de medicamentos narcóticos pueden ponerte en riesgo de problemas de jacki graves, incluyendo, demetrius no limitándose a, depresión respiratoria (disminución de la capacidad para  respirar normalmente), hipotensión (presión arterial baja), náuseas y estreñimiento, picazón generalizada, retención urinaria (incapacidad para orinar) y distensión abdominal.  El Dr. Neal prescribirá medicamentos para el dolor tommy 2-3 semanas después de la cirugía y puede referirlo a un especialista en manejo del dolor para cualquier medicamento narcótico que se solicite después de chikis período.  Prevención de la adicción a los opioides  [facility] en [place] se compromete a proteger a nuestros pacientes de la adicción a los opioides. Solo recetamos opioides cuando es necesario. Siempre que sea posible, utilizamos medicamentos para el dolor menos adictivos y opciones alternativas de manejo del dolor. Tanto los opioides de martinez dosis leonora los de baja dosis tomados tommy períodos prolongados de tiempo pueden aumentar el riesgo de adicción. Intentamos limitar nuestras recetas de opioides después de la cirugía tanto leonora sea posible, lo que puede incluir dosis más bajas de medicamentos opioides o menos pastillas. Si tiene preguntas adicionales sobre los opioides y ibanez dependencia, comuníquese con nuestra clínica.  Información de Contacto  Por hay, comuníquese con Sharee Souza PA-C, la asistente médica del lorenzo Morales [phone] para cualquier pregunta o inquietud médica.  CONTACTO DE EMERGENCIA DESPUÉS DE HORAS: Por sameer guido [phone] y presione “0” para que el operador lo conecte con el residente o el compañero de ortopedia de norm si tiene alguna pregunta o inquietud urgente después del horario laboral normal. Si no recibe respuesta del médico de norm de manera oportuna y ibanez asunto urgente se convierte en angy emergencia, debe presentarse a ibanez lori de emergencias local. Por favor, parker un seguimiento con Sharee Souza PA-C, el siguiente día laboral con angy actualización si tiene que contactarse con el médico de norm o presentarse en ibanez lori de emergencias local después de la  sheldon.    HOME INSTRUCTIONS  DISCHARGE INSTRUCTIONS  Dr. Matias Cortez M.D.  Lumbar Laminectomy / Foraminotomy /   Microdiscectomy / Hemilaminotomy  Post-Operative Instructions - Lumbar     Wound Care   Do not change or remove your initial postoperative dressing for the first 2 days after surgery unless   instructed to do so by Dr. Cortez's staff or if it is saturated by drainage. If removed, please reapply a clean  dry dressing over the incision. You may have thin adhesive paper strips on your incision after surgery--  please do NOT remove them. They will be removed at your 2-week postoperative appointment. You   may remove your dressing and shower on the third day after surgery. Let warm soapy water run over   the incisional area. Do not scrub the area. Gently pat the incisional area completely dry. You may   reapply a clean dry dressing over the incision to prevent irritation from clothing, but this is not required   after post op day 3. You are not allowed to soak your incision in a bathtub, hot tub, or swimming pool   until the incision is completely healed and Dr. Cortez or his staff permit these activities. Do not apply   antibiotic gels, ointments (Neosporin or bacitracin), lotions, peroxide or iodine solutions on or around the   incision.  You may have some swelling and/or clear yellow drainage around your incisional site. This is normal and   may take several weeks to go away. Please leave any superficial scabs alone and let them fall off on their   Own.    Immediate Follow Up   If you notice incisional redness or increased swelling, continuous clear drainage or change in color to the   drainage, malodor, significant leg swelling, increased pain and/or a fever greater than 101.5, you should   call our office. If it is after business hours you should present to the closest emergency room. If you   have shortness of breath, chest pain, significant stomach pain and bloating without a bowel movement,    complete loss of bowel or bladder function please contact our office and present to your local emergency   room as soon as possible.    After surgery you may experience pain in or around the region of the incision. Some buttock and leg pain   as well as tingling or numbness may also be present. Initially it may be of greater intensity than before   surgery but will usually subside over time as the healing process occurs. This discomfort is caused from   surgical retraction of tissue as well as inflammation and swelling of previously compressed nerves.  Early activity after surgery is extremely important to help prevent the complications such as pneumonia   and blood clots. Activity also promotes recovery, relieves muscle stiffness, allows for development of a   well-organized scar, and improves your outcome. Your recovery is an essential part of the surgical   process so please follow the guidelines below to return to your desired activities as safely as possible.      Week 1   Try to get at least 8 hours of sleep each night. A disrupted sleep pattern is common after   discharge from the hospital and will return to normal over time.   Avoid bending and twisting at the waist. No bending more than what is required to get dressed.   No twisting more than required to toilet (wipe yourself).  No sitting for more than 1 hour at a time. Walk and/or change position before returning to a   sitting position.   You may not drive, but you may be driven.  Begin a daily walking program with 1-2 blocks initially; schedule a daily time and increase distance   daily. Ideally, we would like you to be up and walking 15 minutes/hour.   Eat a balanced high-fiber diet and drink plenty of water.  Take medications as prescribed, using narcotics and muscle relaxants only as needed.   Please ask Dr. Cortez or his staff when it is permissible to restart any NSAIDs such as Advil, Motrin,   Aleve, ibuprofen, naproxen, diclofenac, meloxicam or  celecoxib.   Take stool softeners to help with bowel movements.    Week 2   Resume normal rising and retiring schedule but continue to rest throughout the day as needed.  You may not drive, but you may be driven.  No lifting of anything weighing more than 10 to 15 pounds.  Continue scheduled walking, increasing distance and frequency as tolerated.  May resume sexual relations when comfortable between 2 to 4 weeks after surgery.  Begin weaning from narcotic pain medications if you have not already.  Follow-up in the office as scheduled for further instructions.     Disability   The usual period of recovery for laminectomy, foraminotomy, microdiscectomy or hemilaminotomy is 4   to 6 weeks. Complete healing however may take up to 3 to 6 months. Some patients may return to work   sooner than others depending on their type of job, response to surgery and ability to perform other   lighter tasks in the workplace. Physician approval is required prior to returning to work.    Post-Operative Pain Medication Policy   It is Dr. Cortez's aim to make you as comfortable as possible after surgery. We realize pain management   is not perfect, and that you will have some discomfort after your operation. There are several factors   that limit our ability to completely eliminate pain after surgery. This first is that pain medications have   side effects. Please be advised that high doses or continued use of narcotic medications may put you at   risk for serious health issues including but not limited to respiratory depression (decreased ability to  breathe normally), hypotension (low blood pressure), nausea and constipation, generalized itching,   urinary retention (inability to urinate) and abdominal distention.   Dr. Cortez will prescribe pain medication for 2-3 weeks after surgery and may refer you out to a pain    for any narcotic medication requested after this period of time.    Preventing Opioid Addiction    [facility] [place] is committed to protecting our patients from Opioid addiction. We only   prescribe opioids when necessary. Whenever possible, we use less-addictive pain medication and   alternative pain management options. Both high-dosage opioids and low-dosage opioids taken over long   periods of time can increase the risk of addiction. We attempt to limit our prescriptions of opioids after   surgery as much as possible, which may include lower dosages of opioid medications or fewer pills. If you   have additional questions about Opioids and their dependency, please contact our clinic.    Contact Information   Please contact Dr. Diego New’s , at [phone] with any questions   or concerns pertaining to scheduling or other administrative issues.  Please contact Sharee Souza PA-C, Dr. Cortez’s physician assistant, at [phone] with any medical   questions or concerns.     AFTER HOURS EMERGENCY CONTACT: Please dial [phone] and press “0” for the    to connect you to the orthopaedic fellow or resident on call if you have any urgent questions or   concerns after regular business hours. If you do not hear back from the on-call physician in a timely   matter and your urgent matter turns emergent, you should present to your local emergency room.  Please follow-up with Sharee Souza PA-C the following business day with an update if you do have to   contact the on-call physician or present to your local emergency room after surgery.         AMBSURG HOME CARE INSTRUCTIONS: POST-OP ANESTHESIA  The medication that you received for sedation or general anesthesia can last up to 24 hours. Your judgment and reflexes may be altered, even if you feel like your normal self.      We Recommend:   Do not drive any motor vehicle or bicycle   Avoid mowing the lawn, playing sports, or working with power tools/applicances (power saws, electric knives or mixers)   That you have  someone stay with you on your first night home   Do not drink alcohol or take sleeping pills or tranquilizers   Do not sign legal documents within 24 hours of your procedure   If you had a nerve block for your surgery, take extra care not to put any pressure on your arm or hand for 24 hours    It is normal:  For you to have a sore throat if you had a breathing tube during surgery (while you were asleep!). The sore throat should get better within 48 hours. You can gargle with warm salt water (1/2 tsp in 4 oz warm water) or use a throat lozenge for comfort  To feel muscle aches or soreness especially in the abdomen, chest or neck. The achy feeling should go away in the next 24 hours  To feel weak, sleepy or \"wiped out\". Your should start feeling better in the next 24 hours.   To experience mild discomforts such as sore lip or tongue, headache, cramps, gas pains or a bloated feeling in your abdomen.   To experience mild back pain or soreness for a day or two if you had spinal or epidural anesthesia.   If you had laparoscopic surgery, to feel shoulder pain or discomfort on the day of surgery.   For some patients to have nausea after surgery/anesthesia    If you feel nausea or experience vomiting:   Try to move around less.   Eat less than usual or drink only liquids until the next morning   Nausea should resolve in about 24 hours    If you have a problem when you are at home:    Call your surgeons office     Discharge Instructions: After Your Surgery  You’ve just had surgery. During surgery, you were given medicine called anesthesia to keep you relaxed and free of pain. After surgery, you may have some pain or nausea. This is common. Here are some tips for feeling better and getting well after surgery.   Going home  Your healthcare provider will show you how to take care of yourself when you go home. They'll also answer your questions. Have an adult family member or friend drive you home. For the first 24 hours after  your surgery:   Don't drive or use heavy equipment.  Don't make important decisions or sign legal papers.  Take medicines as directed.  Don't drink alcohol.  Have someone stay with you, if needed. They can watch for problems and help keep you safe.  Be sure to go to all follow-up visits with your healthcare provider. And rest after your surgery for as long as your provider tells you to.   Coping with pain  If you have pain after surgery, pain medicine will help you feel better. Take it as directed, before pain becomes severe. Also, ask your healthcare provider or pharmacist about other ways to control pain. This might be with heat, ice, or relaxation. And follow any other instructions your surgeon or nurse gives you.      Stay on schedule with your medicine.     Tips for taking pain medicine  To get the best relief possible, remember these points:   Pain medicines can upset your stomach. Taking them with a little food may help.  Most pain relievers taken by mouth need at least 20 to 30 minutes to start to work.  Don't wait till your pain becomes severe before you take your medicine. Try to time your medicine so that you can take it before starting an activity. This might be before you get dressed, go for a walk, or sit down for dinner.  Constipation is a common side effect of some pain medicines. Call your healthcare provider before taking any medicines, such as laxatives or stool softeners, to help ease constipation. Also ask if you should skip any foods. Drinking lots of fluids and eating foods, such as fruits and vegetables, that are high in fiber can also help. Remember, don't take laxatives unless your surgeon has prescribed them.  Drinking alcohol and taking pain medicine can cause dizziness and slow your breathing. It can even be deadly. Don't drink alcohol while taking pain medicine.  Pain medicine can make you react more slowly to things. Don't drive or run machinery while taking pain medicine.  Your  healthcare provider may tell you to take acetaminophen to help ease your pain. Ask them how much you're supposed to take each day. Acetaminophen or other pain relievers may interact with your prescription medicines or other over-the-counter (OTC) medicines. Some prescription medicines have acetaminophen and other ingredients in them. Using both prescription and OTC acetaminophen for pain can cause you to accidentally overdose. Read the labels on your OTC medicines with care. This will help you to clearly know the list of ingredients, how much to take, and any warnings. It may also help you not take too much acetaminophen. If you have questions or don't understand the information, ask your pharmacist or healthcare provider to explain it to you before you take the OTC medicine.   Managing nausea  Some people have an upset stomach (nausea) after surgery. This is often because of anesthesia, pain, or pain medicine, less movement of food in the stomach, or the stress of surgery. These tips will help you handle nausea and eat healthy foods as you get better. If you were on a special food plan before surgery, ask your healthcare provider if you should follow it while you get better. Check with your provider on how your eating should progress. It may depend on the surgery you had. These general tips may help:   Don't push yourself to eat. Your body will tell you when to eat and how much.  Start off with clear liquids and soup. They're easier to digest.  Next try semi-solid foods as you feel ready. These include mashed potatoes, applesauce, and gelatin.  Slowly move to solid foods. Don’t eat fatty, rich, or spicy foods at first.  Don't force yourself to have 3 large meals a day. Instead eat smaller amounts more often.  Take pain medicines with a small amount of solid food, such as crackers or toast. This helps prevent nausea.  When to call your healthcare provider  Call your healthcare provider right away if you have any of  these:   You still have too much pain, or the pain gets worse, after taking the medicine. The medicine may not be strong enough. Or there may be a complication from the surgery.  You feel too sleepy, dizzy, or groggy. The medicine may be too strong.  Side effects, such as nausea or vomiting. Your healthcare provider may advise taking other medicines to treat these or may change your treatment plan..  Skin changes, such as rash, itching, or hives. This may mean you have an allergic reaction. Your provider may advise taking other medicines.  The incision looks different (for instance, part of it opens up).  Bleeding or fluid leaking from the incision site, and you weren't told to expect that.  Fever of 100.4°F (38°C) or higher, or as directed by your healthcare provider.  Call 911  Call 911 right away if you have:   Trouble breathing  Facial swelling    If you have obstructive sleep apnea   You were given anesthesia medicine during surgery to keep you comfortable and free of pain. After surgery, you may have more apnea spells because of this medicine and other medicines you were given. The spells may last longer than normal.    At home:  Keep using the continuous positive airway pressure (CPAP) device when you sleep. Unless your healthcare provider tells you not to, use it when you sleep, day or night. CPAP is a common device used to treat obstructive sleep apnea.  Talk with your provider before taking any pain medicine, muscle relaxants, or sedatives. Your provider will tell you about the possible dangers of taking these medicines.  Contact your provider if your sleeping changes a lot even when taking medicines as directed.  [employer] last reviewed this educational content on 4/1/2024  This information is for informational purposes only. This is not intended to be a substitute for professional medical advice, diagnosis, or treatment. Always seek the advice and follow the directions from your physician or other  qualified health care provider.  © 7434-6420 [employer]. All rights reserved. This information is not intended as a substitute for professional medical care. Always follow your healthcare professional's instructions.    POST-OP MEDICATION SCHEDULE 24-hour regime example  OXYCODONE prescription  Medication 6 pm 8 pm 10 pm 12 am 2 am 4 am 6am 8 am 10 am 12 pm 2 pm 4 pm   Oxycodone 5 mg 1-2 tabs  1-2 tabs  1-2 tabs  1-2 tabs  1-2 tabs  1-2 tabs    Tylenol 500 mg  2 tabs    2 tabs    2 tabs     Tizanidine 4 mg 1 tab    1 tab    1 tab      Senokot 8.6 mg 1 tab      1 tab        Cefadroxil 500 mg  1 tab      1 tab

## 2025-07-24 ENCOUNTER — ANESTHESIA (OUTPATIENT)
Dept: SURGERY | Facility: HOSPITAL | Age: 55
End: 2025-07-24
Payer: OTHER MISCELLANEOUS

## 2025-07-24 ENCOUNTER — APPOINTMENT (OUTPATIENT)
Dept: GENERAL RADIOLOGY | Facility: HOSPITAL | Age: 55
End: 2025-07-24
Attending: ORTHOPAEDIC SURGERY

## 2025-07-24 ENCOUNTER — HOSPITAL ENCOUNTER (OUTPATIENT)
Facility: HOSPITAL | Age: 55
Setting detail: HOSPITAL OUTPATIENT SURGERY
Discharge: HOME | End: 2025-07-24
Attending: ORTHOPAEDIC SURGERY | Admitting: ORTHOPAEDIC SURGERY

## 2025-07-24 ENCOUNTER — ANESTHESIA EVENT (OUTPATIENT)
Dept: SURGERY | Facility: HOSPITAL | Age: 55
End: 2025-07-24
Payer: OTHER MISCELLANEOUS

## 2025-07-24 VITALS
WEIGHT: 151 LBS | HEART RATE: 79 BPM | DIASTOLIC BLOOD PRESSURE: 77 MMHG | HEIGHT: 66 IN | SYSTOLIC BLOOD PRESSURE: 148 MMHG | OXYGEN SATURATION: 96 % | RESPIRATION RATE: 16 BRPM | BODY MASS INDEX: 24.27 KG/M2 | TEMPERATURE: 98 F

## 2025-07-24 PROCEDURE — 76000 FLUOROSCOPY <1 HR PHYS/QHP: CPT | Performed by: ORTHOPAEDIC SURGERY

## 2025-07-24 RX ORDER — LIDOCAINE HYDROCHLORIDE 10 MG/ML
INJECTION, SOLUTION EPIDURAL; INFILTRATION; INTRACAUDAL; PERINEURAL AS NEEDED
Status: DISCONTINUED | OUTPATIENT
Start: 2025-07-24 | End: 2025-07-24 | Stop reason: SURG

## 2025-07-24 RX ORDER — HYDROMORPHONE HYDROCHLORIDE 1 MG/ML
0.2 INJECTION, SOLUTION INTRAMUSCULAR; INTRAVENOUS; SUBCUTANEOUS EVERY 5 MIN PRN
Status: DISCONTINUED | OUTPATIENT
Start: 2025-07-24 | End: 2025-07-24

## 2025-07-24 RX ORDER — MORPHINE SULFATE 4 MG/ML
2 INJECTION, SOLUTION INTRAMUSCULAR; INTRAVENOUS EVERY 10 MIN PRN
Status: DISCONTINUED | OUTPATIENT
Start: 2025-07-24 | End: 2025-07-24

## 2025-07-24 RX ORDER — MORPHINE SULFATE 10 MG/ML
6 INJECTION, SOLUTION INTRAMUSCULAR; INTRAVENOUS EVERY 10 MIN PRN
Status: DISCONTINUED | OUTPATIENT
Start: 2025-07-24 | End: 2025-07-24

## 2025-07-24 RX ORDER — BUPIVACAINE HCL/EPINEPHRINE 0.5-1:200K
VIAL (ML) INJECTION AS NEEDED
Status: DISCONTINUED | OUTPATIENT
Start: 2025-07-24 | End: 2025-07-24 | Stop reason: HOSPADM

## 2025-07-24 RX ORDER — HYDROMORPHONE HYDROCHLORIDE 1 MG/ML
0.4 INJECTION, SOLUTION INTRAMUSCULAR; INTRAVENOUS; SUBCUTANEOUS EVERY 5 MIN PRN
Status: DISCONTINUED | OUTPATIENT
Start: 2025-07-24 | End: 2025-07-24

## 2025-07-24 RX ORDER — LABETALOL HYDROCHLORIDE 5 MG/ML
5 INJECTION, SOLUTION INTRAVENOUS EVERY 5 MIN PRN
Status: DISCONTINUED | OUTPATIENT
Start: 2025-07-24 | End: 2025-07-24

## 2025-07-24 RX ORDER — METHOCARBAMOL 100 MG/ML
1000 INJECTION, SOLUTION INTRAMUSCULAR; INTRAVENOUS ONCE
Status: DISCONTINUED | OUTPATIENT
Start: 2025-07-24 | End: 2025-07-24

## 2025-07-24 RX ORDER — DEXAMETHASONE SODIUM PHOSPHATE 4 MG/ML
VIAL (ML) INJECTION AS NEEDED
Status: DISCONTINUED | OUTPATIENT
Start: 2025-07-24 | End: 2025-07-24 | Stop reason: SURG

## 2025-07-24 RX ORDER — ONDANSETRON 2 MG/ML
INJECTION INTRAMUSCULAR; INTRAVENOUS AS NEEDED
Status: DISCONTINUED | OUTPATIENT
Start: 2025-07-24 | End: 2025-07-24 | Stop reason: SURG

## 2025-07-24 RX ORDER — HYDRALAZINE HYDROCHLORIDE 20 MG/ML
5 INJECTION INTRAMUSCULAR; INTRAVENOUS EVERY 10 MIN PRN
Status: DISCONTINUED | OUTPATIENT
Start: 2025-07-24 | End: 2025-07-24

## 2025-07-24 RX ORDER — OXYCODONE HYDROCHLORIDE 5 MG/1
5 TABLET ORAL EVERY 4 HOURS PRN
Status: DISCONTINUED | OUTPATIENT
Start: 2025-07-24 | End: 2025-07-24

## 2025-07-24 RX ORDER — PROCHLORPERAZINE EDISYLATE 5 MG/ML
5 INJECTION INTRAMUSCULAR; INTRAVENOUS EVERY 8 HOURS PRN
Status: DISCONTINUED | OUTPATIENT
Start: 2025-07-24 | End: 2025-07-24

## 2025-07-24 RX ORDER — SODIUM CHLORIDE, SODIUM LACTATE, POTASSIUM CHLORIDE, CALCIUM CHLORIDE 600; 310; 30; 20 MG/100ML; MG/100ML; MG/100ML; MG/100ML
INJECTION, SOLUTION INTRAVENOUS CONTINUOUS
Status: DISCONTINUED | OUTPATIENT
Start: 2025-07-24 | End: 2025-07-24

## 2025-07-24 RX ORDER — ONDANSETRON 2 MG/ML
4 INJECTION INTRAMUSCULAR; INTRAVENOUS EVERY 6 HOURS PRN
Status: DISCONTINUED | OUTPATIENT
Start: 2025-07-24 | End: 2025-07-24

## 2025-07-24 RX ORDER — OXYCODONE HYDROCHLORIDE 5 MG/1
10 TABLET ORAL EVERY 4 HOURS PRN
Status: DISCONTINUED | OUTPATIENT
Start: 2025-07-24 | End: 2025-07-24

## 2025-07-24 RX ORDER — PHENYLEPHRINE HCL 10 MG/ML
VIAL (ML) INJECTION AS NEEDED
Status: DISCONTINUED | OUTPATIENT
Start: 2025-07-24 | End: 2025-07-24 | Stop reason: SURG

## 2025-07-24 RX ORDER — METHYLPREDNISOLONE ACETATE 40 MG/ML
INJECTION, SUSPENSION INTRA-ARTICULAR; INTRALESIONAL; INTRAMUSCULAR; SOFT TISSUE AS NEEDED
Status: DISCONTINUED | OUTPATIENT
Start: 2025-07-24 | End: 2025-07-24 | Stop reason: HOSPADM

## 2025-07-24 RX ORDER — ACETAMINOPHEN 500 MG
1000 TABLET ORAL ONCE
Status: COMPLETED | OUTPATIENT
Start: 2025-07-24 | End: 2025-07-24

## 2025-07-24 RX ORDER — MORPHINE SULFATE 4 MG/ML
4 INJECTION, SOLUTION INTRAMUSCULAR; INTRAVENOUS EVERY 10 MIN PRN
Status: DISCONTINUED | OUTPATIENT
Start: 2025-07-24 | End: 2025-07-24

## 2025-07-24 RX ORDER — HYDROMORPHONE HYDROCHLORIDE 1 MG/ML
0.6 INJECTION, SOLUTION INTRAMUSCULAR; INTRAVENOUS; SUBCUTANEOUS EVERY 5 MIN PRN
Status: DISCONTINUED | OUTPATIENT
Start: 2025-07-24 | End: 2025-07-24

## 2025-07-24 RX ORDER — ROCURONIUM BROMIDE 10 MG/ML
INJECTION, SOLUTION INTRAVENOUS AS NEEDED
Status: DISCONTINUED | OUTPATIENT
Start: 2025-07-24 | End: 2025-07-24 | Stop reason: SURG

## 2025-07-24 RX ORDER — NALOXONE HYDROCHLORIDE 0.4 MG/ML
0.08 INJECTION, SOLUTION INTRAMUSCULAR; INTRAVENOUS; SUBCUTANEOUS AS NEEDED
Status: DISCONTINUED | OUTPATIENT
Start: 2025-07-24 | End: 2025-07-24

## 2025-07-24 RX ORDER — ALBUTEROL SULFATE 0.83 MG/ML
2.5 SOLUTION RESPIRATORY (INHALATION) AS NEEDED
Status: DISCONTINUED | OUTPATIENT
Start: 2025-07-24 | End: 2025-07-24

## 2025-07-24 RX ORDER — ACETAMINOPHEN 10 MG/ML
1000 INJECTION, SOLUTION INTRAVENOUS ONCE
Status: DISCONTINUED | OUTPATIENT
Start: 2025-07-24 | End: 2025-07-24

## 2025-07-24 RX ADMIN — PHENYLEPHRINE HCL 100 MCG: 10 MG/ML VIAL (ML) INJECTION at 08:39:00

## 2025-07-24 RX ADMIN — PHENYLEPHRINE HCL 150 MCG: 10 MG/ML VIAL (ML) INJECTION at 08:24:00

## 2025-07-24 RX ADMIN — LIDOCAINE HYDROCHLORIDE 40 MG: 10 INJECTION, SOLUTION EPIDURAL; INFILTRATION; INTRACAUDAL; PERINEURAL at 07:19:00

## 2025-07-24 RX ADMIN — ONDANSETRON 4 MG: 2 INJECTION INTRAMUSCULAR; INTRAVENOUS at 09:07:00

## 2025-07-24 RX ADMIN — SODIUM CHLORIDE, SODIUM LACTATE, POTASSIUM CHLORIDE, CALCIUM CHLORIDE: 600; 310; 30; 20 INJECTION, SOLUTION INTRAVENOUS at 09:00:00

## 2025-07-24 RX ADMIN — PHENYLEPHRINE HCL 100 MCG: 10 MG/ML VIAL (ML) INJECTION at 08:05:00

## 2025-07-24 RX ADMIN — ROCURONIUM BROMIDE 50 MG: 10 INJECTION, SOLUTION INTRAVENOUS at 07:20:00

## 2025-07-24 RX ADMIN — SODIUM CHLORIDE, SODIUM LACTATE, POTASSIUM CHLORIDE, CALCIUM CHLORIDE: 600; 310; 30; 20 INJECTION, SOLUTION INTRAVENOUS at 09:31:00

## 2025-07-24 RX ADMIN — DEXAMETHASONE SODIUM PHOSPHATE 8 MG: 4 MG/ML VIAL (ML) INJECTION at 07:35:00

## 2025-07-24 NOTE — ANESTHESIA PROCEDURE NOTES
Airway  Date/Time: 7/24/2025 7:22 AM  Reason: elective    Airway not difficult    General Information and Staff   Patient location during procedure: OR  Anesthesiologist: Matheus Pickering MD  Performed: anesthesiologist   Performed by: Matheus Pickering MD  Authorized by: Matheus Pickering MD        Indications and Patient Condition  Indications for airway management: anesthesia  Sedation level: deep      Preoxygenated: yesPatient position: sniffing  MILS maintained throughout    Mask difficulty assessment: 1 - vent by mask    Final Airway Details    Final airway type: endotracheal airway    Successful airway: ETT  Cuffed: yes   Successful intubation technique: Video laryngoscopy  Facilitating devices/methods: intubating stylet  Endotracheal tube insertion site: oral  Blade type: Identified video laryngoscope.  Blade size: #4  ETT size (mm): 7.5    Cormack-Lehane Classification: grade I - full view of glottis  Placement verified by: capnometry   Measured from: lips  ETT to lips (cm): 23  Number of attempts at approach: 1

## 2025-07-24 NOTE — OPERATIVE REPORT
PATIENT  Chester Sky    DATE OF SURGERY  7/24/2025    SURGEON   Matias Cortez MD    ASSISTANT  ANTONIO Mcintosh    PREOPERATIVE DIAGNOSIS   L4-5 spinal stenosis  Left L5-S1 Herniated nucleus pulposus   Left lumbar radiculopathy    POSTOPERATIVE DIAGNOSIS   Same    PROCEDURES   Left L4-5 MIS laminectomy  2. L5-S1 laminectomy/diskectomy from a left sided approach under direct visualization.   3. Use of fluoroscopy.   4. Use of intra-operative microscope.     DESCRIPTION OF PROCEDURE   Large HNP removed without incident    ANESTHESIA   General endotracheal    COMPLICATIONS   None.     BLOOD LOSS   Minimal.     INDICATIONS   The patient is a 55 year-old male with persistent lumbar radiculopathy with motor weakness due to large HNP at L5-S1 and spinal stenosis at L4-5. The patient failed   nonoperative intervention and they elected to proceed with surgical   Decompression. The patient understood the risks and benefits, including risks of failure to improve, neurologic deterioration, infection, durotomy, continued low back pain, and progression of her spondylolisthesis were explained to the patient at length. The patient also understood the risks of anesthesia which include possible stroke, MI, death.       TECHNIQUE   The patient was brought to the operating room. General   anesthesia with endotracheal intubation was performed. The patient was positioned prone on the Tadeo spinal frame. Care was taken to ensure bony prominences   were well padded. The lower back was prepped and draped in the   Usual sterile fashion. A surgical timeout was performed according to the WHO standards identifying the appropriate patient, surgical site, and surgical procedure.   Under fluoroscopy, an incision was made just off the   midline overlying the L5-S1 interlaminar window. The incision   continued through fascia with Bovie dissection. Sequential   dilators were advanced into the caudal edge of the L5 lamina   under  fluoroscopy. A tubular retractor was then advanced and secured   to the operating table. Positioning of this was confirmed with   biplanar fluoroscopy.     The microscope was brought into the field. Soft tissue was   cleared off bony edges of the interlaminar window. Using a ilia, the   caudal edge of the L5 lamina was removed down to the ligamentum flavum. A partial medial facetectomy was performed in order to visualize the lateral recess. The ligamentum flavum was then penetrated with a microcurette and   removed piecemeal. The dura sac, traversing nerve root and HNP were identified. The HNP was isolated and removed in its entirety.    An angled hockey stick elevator was tracked underneath the thecal sac proximally and distally and ensured adequate decompression with no remaining fragments compressing the nerve root or the thecal sac. At this point, we were satisfied with the extent of the decompression, the neural elements were seen to be free.     The tubualr retractor was then placed     The wound was thoroughly irrigated and hemostasis was ensured.   Sterile solution was infiltrated.   Meticulous hemostasis was achieved.   Fascia was approximated with 0 Vicryl suture. Subcutaneous tissue   and skin was approximated with suture. Sterile dressing applied.   The patient returned to recovery in stable condition.     I was present for and performed the entire procedure.

## 2025-07-24 NOTE — BRIEF OP NOTE
Pre-Operative Diagnosis: Lumbar other intervertebral disc displacement, lumbar radiculopathy, lumbar stenosis     Post-Operative Diagnosis: Lumbar other intervertebral disc displacement, lumbar radiculopathy, lumbar stenosis      Procedure Performed:   Left L4-5 hemilaminotomy, left L5-S1 microdiscectomy    Surgeons and Role:     * Matias Cortez MD - Primary     * David Jama MD - Assisting Surgeon    Assistant(s):  PA: Sharee Souza, SHANIQUA     Surgical Findings: PLD L L4-5 BJORN R L5-S1     Specimen: na     Estimated Blood Loss: No data recorded    David Jama MD  7/24/2025  9:20 AM

## 2025-07-24 NOTE — ANESTHESIA PREPROCEDURE EVALUATION
Anesthesia PreOp Note    HPI:     Chester Sky is a 55 year old male who presents for preoperative consultation requested by: Matias Cortez MD    Date of Surgery: 7/24/2025    Procedure(s):  Left L4-5 hemilaminotomy, left L5-S1 microdiscectomy  Indication: Lumbar other intervertebral disc displacement, lumbar radiculopathy, lumbar stenosis    Relevant Problems   No relevant active problems       NPO:  Last Liquid Consumption Date: 07/23/25  Last Liquid Consumption Time: 2030  Last Solid Consumption Date: 07/23/25  Last Solid Consumption Time: 1900  Last Liquid Consumption Date: 07/23/25          History Review:  Patient Active Problem List    Diagnosis Date Noted    Special screening for malignant neoplasms, colon 02/16/2022    Gastroesophageal reflux disease 02/16/2022    Special screening for malignant neoplasm of colon 02/16/2022    Colon cancer screening 01/28/2022    Dyspepsia 01/28/2022       Past Medical History[1]    Past Surgical History[2]    Prescriptions Prior to Admission[3]  Current Medications and Prescriptions Ordered in Epic[4]    Allergies[5]    Family History[6]  Social Hx on file[7]    Available pre-op labs reviewed.  Lab Results   Component Value Date    WBC 5.8 07/14/2025    RBC 4.74 07/14/2025    HGB 14.4 07/14/2025    HCT 41.8 07/14/2025    MCV 88.2 07/14/2025    MCH 30.4 07/14/2025    MCHC 34.4 07/14/2025    RDW 13.1 07/14/2025    .0 07/14/2025     Lab Results   Component Value Date     07/14/2025    K 3.8 07/14/2025     07/14/2025    CO2 31.0 07/14/2025    BUN 16 07/14/2025    CREATSERUM 1.01 07/14/2025    GLU 97 07/14/2025    CA 9.2 07/14/2025     Lab Results   Component Value Date    INR 0.98 07/14/2025       Vital Signs:  Body mass index is 24.37 kg/m².   height is 1.676 m (5' 6\") and weight is 68.5 kg (151 lb). His oral temperature is 97.9 °F (36.6 °C). His blood pressure is 133/85 and his pulse is 72. His respiration is 16 and oxygen saturation is 98%.    Vitals:    07/21/25 1055 07/24/25 0558   BP:  133/85   Pulse:  72   Resp:  16   Temp:  97.9 °F (36.6 °C)   TempSrc:  Oral   SpO2:  98%   Weight: 68 kg (150 lb) 68.5 kg (151 lb)   Height: 1.676 m (5' 6\") 1.676 m (5' 6\")        Anesthesia Evaluation     Patient summary reviewed and Nursing notes reviewed    Airway   Mallampati: II  TM distance: <3 FB  Neck ROM: full  Dental      Pulmonary     breath sounds clear to auscultation  Cardiovascular   Exercise tolerance: good    Rhythm: regular  Rate: normal    Neuro/Psych    (+)  neuromuscular disease,        GI/Hepatic/Renal    (+) GERD    Endo/Other    Abdominal                  Anesthesia Plan:   ASA:  1  Plan:   General  Airway:  ETT and Video laryngoscope  Informed Consent Plan and Risks Discussed With:  Patient  Discussed plan with:  Attending      I have informed Chester Sky and/or legal guardian or family member of the nature of the anesthetic plan, benefits, risks including possible dental damage if relevant, major complications, and any alternative forms of anesthetic management.   All of the patient's questions were answered to the best of my ability. The patient desires the anesthetic management as planned.  Matheus Pickering MD  7/24/2025 6:43 AM  Present on Admission:  **None**           [1]   Past Medical History:   Arrhythmia    MURMUR    Back problem    Esophageal reflux    Visual impairment   [2] History reviewed. No pertinent surgical history.  [3]   Medications Prior to Admission   Medication Sig Dispense Refill Last Dose/Taking    nitroglycerin 0.4 MG Sublingual SL Tab nitroglycerin 0.4 mg sublingual tablet, [RxNorm: 714586]   Taking    ibuprofen 200 MG Oral Tab Take 1 tablet (200 mg total) by mouth every 6 (six) hours as needed for Pain.   7/17/2025   [4]   Current Facility-Administered Medications Ordered in Epic   Medication Dose Route Frequency Provider Last Rate Last Admin    lactated ringers infusion   Intravenous Continuous Diego,  MD Matias 20 mL/hr at 07/24/25 0637 New Bag at 07/24/25 0637    ceFAZolin (Ancef) 2g in 10mL IV syringe premix  2 g Intravenous Once Matias Cortez MD         No current [facility]-ordered outpatient medications on file.   [5] No Known Allergies  [6]   Family History  Problem Relation Age of Onset    Cancer Father     Stroke Mother    [7]   Social History  Socioeconomic History    Marital status:    Tobacco Use    Smoking status: Never     Passive exposure: Never    Smokeless tobacco: Never    Tobacco comments:     non- smoker   Vaping Use    Vaping status: Never Used   Substance and Sexual Activity    Alcohol use: Never    Drug use: Never

## 2025-07-24 NOTE — H&P
[facility]  part of [facility]    History & Physical    Chester Sky Patient Status:  Hospital Outpatient Surgery    1970 MRN [id number]   Location [facility] PRE OP RECOVERY Attending Matias Cortez MD   Hosp Day # 0 PCP PHYSICIAN NONSTAFF     Date:  2025  Date of Admission:  2025    History:  Past Medical History[1]  Past Surgical History[2]  Family History[3]   reports that he has never smoked. He has never been exposed to tobacco smoke. He has never used smokeless tobacco. He reports that he does not drink alcohol and does not use drugs.    Allergies:  Allergies[4]    Home Medications:  Prescriptions Prior to Admission[5]    Review of Systems:  12 point ROS reviewed without pertinent positives.     HPI: The patient presents with complaints of low back pain with radiculopathy. The pain radiates from the low back to the left buttock, lateral hip down the leg to the foot. This is a work related injury that occurred on 24. They deny prior lumbar spine surgery.  They deny current fevers, chills, infection, rashes/bruises on the body, gross bowel/bladder incontinence or saddle anesthesia.     Physical Exam:  The patient is well developed, no acute distress, alert and oriented x3, skin intact to the posterior lumbar without erythema or edema, motor exam with 4/5 tib ant on the left otherwise 5/5 bilateral lower extremities, calves soft and non tender, 2+DP        Assessment:  Problem List[6]  Lumbar HNP and stenosis with left lumbar radiculopathy    Plan:  Left L4-5 MIS laminectomy, left L5-S1 microdiscectomy       Sharee Souza PA-C  2025  6:47 AM        [1]   Past Medical History:   Arrhythmia    MURMUR    Back problem    Esophageal reflux    Visual impairment   [2] History reviewed. No pertinent surgical history.  [3]   Family History  Problem Relation Age of Onset    Cancer Father     Stroke Mother    [4] No Known Allergies  [5]   Medications Prior  to Admission   Medication Sig Dispense Refill Last Dose/Taking    nitroglycerin 0.4 MG Sublingual SL Tab nitroglycerin 0.4 mg sublingual tablet, [RxNorm: 749465]   Taking    ibuprofen 200 MG Oral Tab Take 1 tablet (200 mg total) by mouth every 6 (six) hours as needed for Pain.   7/17/2025   [6]   Patient Active Problem List  Diagnosis    Colon cancer screening    Dyspepsia    Special screening for malignant neoplasms, colon    Gastroesophageal reflux disease    Special screening for malignant neoplasm of colon

## 2025-07-25 NOTE — ANESTHESIA POSTPROCEDURE EVALUATION
Patient: Chester Sky    Procedure Summary       Date: 07/24/25 Room / Location: [facility] MAIN OR [address] [place] [address] MAIN OR    Anesthesia Start: 0717 Anesthesia Stop: 0931    Procedure: Left L4-5 hemilaminotomy, left L5-S1 microdiscectomy (Spine Lumbar) Diagnosis: (Lumbar other intervertebral disc displacement, lumbar radiculopathy, lumbar stenosis)    Surgeons: Matias Cortez MD Anesthesiologist: Matheus Pickering MD    Anesthesia Type: general ASA Status: 1            Anesthesia Type: general    Vitals Value Taken Time   /77 07/24/25 10:01   Temp 97.8 °F (36.6 °C) 07/24/25 09:29   Pulse 79 07/24/25 10:01   Resp 16 07/24/25 10:01   SpO2 96 % 07/24/25 10:01       [facility] AN Post Evaluation:   Patient Evaluated in PACU  Patient Participation: complete - patient participated  Level of Consciousness: awake and alert  Pain Score: 2  Pain Management: adequate  Airway Patency:patent  Dental exam unchanged from preop  Yes    Nausea/Vomiting: none  Cardiovascular Status: acceptable  Respiratory Status: acceptable  Postoperative Hydration acceptable      Matheus Pickering MD  7/25/2025 8:02 AM

## (undated) DEVICE — MARKER SUR SKIN ST GENTIAN VLT STD REG TIP

## (undated) DEVICE — PACK CDS LAMINECTOMY

## (undated) DEVICE — TUBING MEGADYNE SPECULUM

## (undated) DEVICE — SUT COAT VCRL+ 0 27IN UR-6 ABSRB VLT ANTIBACT

## (undated) DEVICE — BUR SUR 3.8MM PRECIS NEURO MTCH HD

## (undated) DEVICE — PAD NONADHESIVE W3XL4IN WHT POLY COT BRTH

## (undated) DEVICE — GLOVE SUR 6.5 SENSICARE PI PIP GRN PWD F

## (undated) DEVICE — GLOVE SUR 7 SENSICARE PI PIP GRN PWD F

## (undated) DEVICE — Device

## (undated) DEVICE — SUT MCRYL 3-0 18IN PS-2 ABSRB UD 19MM 3/8 CIR

## (undated) DEVICE — DRAPE C ARM TRNSPAR POLY PROTCT BARR FOR UNIV

## (undated) DEVICE — SUT COAT VCRL 2-0 27IN ABSRB UD 26MM CT-2

## (undated) DEVICE — GLOVE SUR 6.5 SENSICARE PI MIC PIP CRM PWD F

## (undated) DEVICE — GLOVE SUR 7 SENSICARE PI MIC PIP CRM PWD F

## (undated) DEVICE — HEMOSTAT KIT SURGIFLO THROM 8ML

## (undated) DEVICE — GLOVE SUR 8.5 SENSICARE PI MIC PIP CRM PWD F

## (undated) DEVICE — WRAP COOLING BACK W/NO PILLOW